# Patient Record
Sex: FEMALE | Race: OTHER | NOT HISPANIC OR LATINO | Employment: FULL TIME | URBAN - METROPOLITAN AREA
[De-identification: names, ages, dates, MRNs, and addresses within clinical notes are randomized per-mention and may not be internally consistent; named-entity substitution may affect disease eponyms.]

---

## 2017-04-10 ENCOUNTER — OFFICE VISIT (OUTPATIENT)
Dept: FAMILY MEDICINE CLINIC | Facility: CLINIC | Age: 32
End: 2017-04-10

## 2017-04-10 VITALS
OXYGEN SATURATION: 98 % | WEIGHT: 159 LBS | SYSTOLIC BLOOD PRESSURE: 118 MMHG | HEART RATE: 75 BPM | DIASTOLIC BLOOD PRESSURE: 82 MMHG | BODY MASS INDEX: 28.17 KG/M2 | TEMPERATURE: 97.7 F | HEIGHT: 63 IN

## 2017-04-10 DIAGNOSIS — Z87.442 HISTORY OF RENAL STONE: ICD-10-CM

## 2017-04-10 DIAGNOSIS — R10.9 FLANK PAIN: Primary | ICD-10-CM

## 2017-04-10 DIAGNOSIS — R31.9 HEMATURIA: ICD-10-CM

## 2017-04-10 LAB
B-HCG UR QL: NEGATIVE
BILIRUB BLD-MCNC: NEGATIVE MG/DL
CLARITY, POC: CLEAR
COLOR UR: ABNORMAL
GLUCOSE UR STRIP-MCNC: NEGATIVE MG/DL
INTERNAL NEGATIVE CONTROL: NEGATIVE
INTERNAL POSITIVE CONTROL: POSITIVE
KETONES UR QL: ABNORMAL
LEUKOCYTE EST, POC: NEGATIVE
Lab: NORMAL
NITRITE UR-MCNC: NEGATIVE MG/ML
PH UR: 5 [PH] (ref 5–8)
PROT UR STRIP-MCNC: ABNORMAL MG/DL
RBC # UR STRIP: ABNORMAL /UL
SP GR UR: 1.01 (ref 1–1.03)
UROBILINOGEN UR QL: NORMAL

## 2017-04-10 PROCEDURE — 99214 OFFICE O/P EST MOD 30 MIN: CPT | Performed by: NURSE PRACTITIONER

## 2017-04-10 PROCEDURE — 81002 URINALYSIS NONAUTO W/O SCOPE: CPT | Performed by: NURSE PRACTITIONER

## 2017-04-10 PROCEDURE — 81025 URINE PREGNANCY TEST: CPT | Performed by: NURSE PRACTITIONER

## 2017-04-10 RX ORDER — PROPRANOLOL HYDROCHLORIDE 10 MG/1
10 TABLET ORAL 3 TIMES DAILY
COMMUNITY
Start: 2017-01-10 | End: 2021-07-12

## 2017-04-10 RX ORDER — HYDROCODONE BITARTRATE AND ACETAMINOPHEN 10; 325 MG/1; MG/1
1 TABLET ORAL EVERY 6 HOURS PRN
Qty: 20 TABLET | Refills: 0 | Status: SHIPPED | OUTPATIENT
Start: 2017-04-10 | End: 2018-04-30

## 2017-04-10 RX ORDER — LEVOTHYROXINE SODIUM 112 UG/1
100 CAPSULE ORAL DAILY
COMMUNITY
Start: 2017-03-27 | End: 2019-11-08

## 2017-04-10 RX ORDER — NITROFURANTOIN 25; 75 MG/1; MG/1
100 CAPSULE ORAL 2 TIMES DAILY
Qty: 14 CAPSULE | Refills: 0 | Status: SHIPPED | OUTPATIENT
Start: 2017-04-10 | End: 2017-04-27

## 2017-04-10 RX ORDER — LIOTHYRONINE SODIUM 5 UG/1
5 TABLET ORAL DAILY
COMMUNITY
Start: 2017-04-01 | End: 2023-03-28

## 2017-04-10 NOTE — PROGRESS NOTES
Subjective   Aster Rosenberg is a 31 y.o. female.     History of Present Illness Patient states that she has had lower left back pain for 3 weeks. Today it has been constant and stabbing. Pt has a remote hx of a kidney stone 15 years ago.  LMP Feb 20, 2017 - Pt is actively trying to conceive. Pt reports hematuria and flank pain. Pain 9/10.   Urinary Tract Infection: Patient complains of burning with urination, dysuria, hematuria, nausea, suprapubic pressure and urgency She has had symptoms for 3 weeks. Patient also complains of back pain. Patient denies fever. Patient does have a history of recurrent UTI.  Patient does not have a history of pyelonephritis.     The following portions of the patient's history were reviewed and updated as appropriate: allergies, current medications, past family history, past medical history, past social history, past surgical history and problem list.    Review of Systems   Constitutional: Negative for chills and fever.   Gastrointestinal: Positive for nausea.   Genitourinary: Positive for frequency.   Musculoskeletal: Positive for back pain.       Objective   Physical Exam   Constitutional: She appears well-developed and well-nourished. No distress.   Cardiovascular: Normal rate and regular rhythm.    Pulmonary/Chest: Effort normal.   Abdominal: Soft. There is no tenderness. There is CVA tenderness. There is no guarding.   Musculoskeletal: She exhibits no edema.   Skin: Skin is warm and dry. No rash noted.   Nursing note and vitals reviewed.      Assessment/Plan   Aster was seen today for back pain.    Diagnoses and all orders for this visit:    Flank pain  -     POCT urinalysis dipstick, manual  -     POCT pregnancy, urine  -     Ambulatory Referral to Urology  -     HYDROcodone-acetaminophen (NORCO)  MG per tablet; Take 1 tablet by mouth Every 6 (Six) Hours As Needed for Moderate Pain (4-6).  -     nitrofurantoin, macrocrystal-monohydrate, (MACROBID) 100 MG capsule; Take 1  capsule by mouth 2 (Two) Times a Day.    Hematuria  -     POCT urinalysis dipstick, manual  -     POCT pregnancy, urine  -     Ambulatory Referral to Urology  -     HYDROcodone-acetaminophen (NORCO)  MG per tablet; Take 1 tablet by mouth Every 6 (Six) Hours As Needed for Moderate Pain (4-6).  -     nitrofurantoin, macrocrystal-monohydrate, (MACROBID) 100 MG capsule; Take 1 capsule by mouth 2 (Two) Times a Day.    History of renal stone  -     Ambulatory Referral to Urology  -     nitrofurantoin, macrocrystal-monohydrate, (MACROBID) 100 MG capsule; Take 1 capsule by mouth 2 (Two) Times a Day.    Sulfa makes face swell.     Pt does not want to go to the ER. I advised to increase fluids, IF PT GETS A FEVER, WORSENING BACK PAIN TO GET TO ER.   HALIMA query complete. Treatment plan to include limited course of prescribed  controlled substance. Risks including addiction, benefits, and alternatives presented to patient.

## 2017-04-10 NOTE — PATIENT INSTRUCTIONS
Kidney Stones  Kidney stones (urolithiasis) are deposits that form inside your kidneys. The intense pain is caused by the stone moving through the urinary tract. When the stone moves, the ureter goes into spasm around the stone. The stone is usually passed in the urine.   CAUSES   · A disorder that makes certain neck glands produce too much parathyroid hormone (primary hyperparathyroidism).  · A buildup of uric acid crystals, similar to gout in your joints.  · Narrowing (stricture) of the ureter.  · A kidney obstruction present at birth (congenital obstruction).  · Previous surgery on the kidney or ureters.  · Numerous kidney infections.  SYMPTOMS   · Feeling sick to your stomach (nauseous).  · Throwing up (vomiting).  · Blood in the urine (hematuria).  · Pain that usually spreads (radiates) to the groin.  · Frequency or urgency of urination.  DIAGNOSIS   · Taking a history and physical exam.  · Blood or urine tests.  · CT scan.  · Occasionally, an examination of the inside of the urinary bladder (cystoscopy) is performed.  TREATMENT   · Observation.  · Increasing your fluid intake.  · Extracorporeal shock wave lithotripsy--This is a noninvasive procedure that uses shock waves to break up kidney stones.  · Surgery may be needed if you have severe pain or persistent obstruction. There are various surgical procedures. Most of the procedures are performed with the use of small instruments. Only small incisions are needed to accommodate these instruments, so recovery time is minimized.  The size, location, and chemical composition are all important variables that will determine the proper choice of action for you. Talk to your health care provider to better understand your situation so that you will minimize the risk of injury to yourself and your kidney.   HOME CARE INSTRUCTIONS   · Drink enough water and fluids to keep your urine clear or pale yellow. This will help you to pass the stone or stone fragments.  · Strain  all urine through the provided strainer. Keep all particulate matter and stones for your health care provider to see. The stone causing the pain may be as small as a grain of salt. It is very important to use the strainer each and every time you pass your urine. The collection of your stone will allow your health care provider to analyze it and verify that a stone has actually passed. The stone analysis will often identify what you can do to reduce the incidence of recurrences.  · Only take over-the-counter or prescription medicines for pain, discomfort, or fever as directed by your health care provider.  · Keep all follow-up visits as told by your health care provider. This is important.  · Get follow-up X-rays if required. The absence of pain does not always mean that the stone has passed. It may have only stopped moving. If the urine remains completely obstructed, it can cause loss of kidney function or even complete destruction of the kidney. It is your responsibility to make sure X-rays and follow-ups are completed. Ultrasounds of the kidney can show blockages and the status of the kidney. Ultrasounds are not associated with any radiation and can be performed easily in a matter of minutes.  · Make changes to your daily diet as told by your health care provider. You may be told to:  ¨ Limit the amount of salt that you eat.  ¨ Eat 5 or more servings of fruits and vegetables each day.  ¨ Limit the amount of meat, poultry, fish, and eggs that you eat.  · Collect a 24-hour urine sample as told by your health care provider. You may need to collect another urine sample every 6-12 months.  SEEK MEDICAL CARE IF:  · You experience pain that is progressive and unresponsive to any pain medicine you have been prescribed.  SEEK IMMEDIATE MEDICAL CARE IF:   · Pain cannot be controlled with the prescribed medicine.  · You have a fever or shaking chills.  · The severity or intensity of pain increases over 18 hours and is not  relieved by pain medicine.  · You develop a new onset of abdominal pain.  · You feel faint or pass out.  · You are unable to urinate.     This information is not intended to replace advice given to you by your health care provider. Make sure you discuss any questions you have with your health care provider.     Document Released: 12/18/2006 Document Revised: 09/07/2016 Document Reviewed: 05/21/2014  [employer] Interactive Patient Education ©2016 [employer]

## 2017-04-27 RX ORDER — CEPHALEXIN 500 MG/1
500 CAPSULE ORAL 3 TIMES DAILY
COMMUNITY
End: 2018-04-30

## 2017-04-28 ENCOUNTER — ANESTHESIA EVENT (OUTPATIENT)
Dept: PERIOP | Facility: HOSPITAL | Age: 32
End: 2017-04-28

## 2017-04-28 ENCOUNTER — ANESTHESIA (OUTPATIENT)
Dept: PERIOP | Facility: HOSPITAL | Age: 32
End: 2017-04-28

## 2017-04-28 ENCOUNTER — HOSPITAL ENCOUNTER (OUTPATIENT)
Facility: HOSPITAL | Age: 32
Setting detail: HOSPITAL OUTPATIENT SURGERY
Discharge: HOME | End: 2017-04-28
Attending: UROLOGY | Admitting: UROLOGY

## 2017-04-28 ENCOUNTER — APPOINTMENT (OUTPATIENT)
Dept: GENERAL RADIOLOGY | Facility: HOSPITAL | Age: 32
End: 2017-04-28
Attending: UROLOGY

## 2017-04-28 VITALS
HEIGHT: 63 IN | SYSTOLIC BLOOD PRESSURE: 102 MMHG | RESPIRATION RATE: 16 BRPM | BODY MASS INDEX: 27.46 KG/M2 | TEMPERATURE: 97.9 F | HEART RATE: 68 BPM | OXYGEN SATURATION: 99 % | DIASTOLIC BLOOD PRESSURE: 72 MMHG | WEIGHT: 155 LBS

## 2017-04-28 DIAGNOSIS — N20.1 URETERAL CALCULUS, RIGHT: Primary | ICD-10-CM

## 2017-04-28 LAB
B-HCG UR QL: NEGATIVE
INTERNAL NEGATIVE CONTROL: NEGATIVE
INTERNAL POSITIVE CONTROL: POSITIVE
Lab: NORMAL

## 2017-04-28 PROCEDURE — 74000 HC ABDOMEN KUB: CPT

## 2017-04-28 PROCEDURE — 25010000003 CEFAZOLIN PER 500 MG: Performed by: UROLOGY

## 2017-04-28 PROCEDURE — 25010000002 DEXAMETHASONE PER 1 MG: Performed by: NURSE ANESTHETIST, CERTIFIED REGISTERED

## 2017-04-28 PROCEDURE — 25010000002 PROPOFOL 10 MG/ML EMULSION: Performed by: NURSE ANESTHETIST, CERTIFIED REGISTERED

## 2017-04-28 PROCEDURE — 25010000002 MIDAZOLAM PER 1 MG: Performed by: ANESTHESIOLOGY

## 2017-04-28 PROCEDURE — 25010000002 FENTANYL CITRATE (PF) 100 MCG/2ML SOLUTION: Performed by: ANESTHESIOLOGY

## 2017-04-28 PROCEDURE — 25010000002 ONDANSETRON PER 1 MG: Performed by: NURSE ANESTHETIST, CERTIFIED REGISTERED

## 2017-04-28 RX ORDER — HYDRALAZINE HYDROCHLORIDE 20 MG/ML
5 INJECTION INTRAMUSCULAR; INTRAVENOUS
Status: DISCONTINUED | OUTPATIENT
Start: 2017-04-28 | End: 2017-04-28 | Stop reason: HOSPADM

## 2017-04-28 RX ORDER — ACETAMINOPHEN 325 MG/1
650 TABLET ORAL ONCE AS NEEDED
Status: DISCONTINUED | OUTPATIENT
Start: 2017-04-28 | End: 2017-04-28 | Stop reason: HOSPADM

## 2017-04-28 RX ORDER — NALOXONE HCL 0.4 MG/ML
0.4 VIAL (ML) INJECTION AS NEEDED
Status: DISCONTINUED | OUTPATIENT
Start: 2017-04-28 | End: 2017-04-28 | Stop reason: HOSPADM

## 2017-04-28 RX ORDER — HYDROMORPHONE HYDROCHLORIDE 1 MG/ML
0.25 INJECTION, SOLUTION INTRAMUSCULAR; INTRAVENOUS; SUBCUTANEOUS
Status: DISCONTINUED | OUTPATIENT
Start: 2017-04-28 | End: 2017-04-28 | Stop reason: HOSPADM

## 2017-04-28 RX ORDER — NALBUPHINE HCL 10 MG/ML
2 AMPUL (ML) INJECTION EVERY 4 HOURS PRN
Status: DISCONTINUED | OUTPATIENT
Start: 2017-04-28 | End: 2017-04-28 | Stop reason: HOSPADM

## 2017-04-28 RX ORDER — PROMETHAZINE HYDROCHLORIDE 25 MG/1
25 TABLET ORAL ONCE AS NEEDED
Status: DISCONTINUED | OUTPATIENT
Start: 2017-04-28 | End: 2017-04-28 | Stop reason: HOSPADM

## 2017-04-28 RX ORDER — PROMETHAZINE HYDROCHLORIDE 25 MG/ML
6.25 INJECTION, SOLUTION INTRAMUSCULAR; INTRAVENOUS ONCE AS NEEDED
Status: DISCONTINUED | OUTPATIENT
Start: 2017-04-28 | End: 2017-04-28 | Stop reason: HOSPADM

## 2017-04-28 RX ORDER — MIDAZOLAM HYDROCHLORIDE 1 MG/ML
1 INJECTION INTRAMUSCULAR; INTRAVENOUS
Status: DISCONTINUED | OUTPATIENT
Start: 2017-04-28 | End: 2017-04-28 | Stop reason: HOSPADM

## 2017-04-28 RX ORDER — SCOLOPAMINE TRANSDERMAL SYSTEM 1 MG/1
1 PATCH, EXTENDED RELEASE TRANSDERMAL ONCE
Status: DISCONTINUED | OUTPATIENT
Start: 2017-04-28 | End: 2017-04-28 | Stop reason: HOSPADM

## 2017-04-28 RX ORDER — CIPROFLOXACIN 500 MG/1
500 TABLET, FILM COATED ORAL 2 TIMES DAILY
Qty: 10 TABLET | Refills: 0 | Status: SHIPPED | OUTPATIENT
Start: 2017-04-28 | End: 2017-05-03

## 2017-04-28 RX ORDER — ACETAMINOPHEN 650 MG/1
650 SUPPOSITORY RECTAL ONCE AS NEEDED
Status: DISCONTINUED | OUTPATIENT
Start: 2017-04-28 | End: 2017-04-28 | Stop reason: HOSPADM

## 2017-04-28 RX ORDER — LIDOCAINE HYDROCHLORIDE 20 MG/ML
INJECTION, SOLUTION INFILTRATION; PERINEURAL AS NEEDED
Status: DISCONTINUED | OUTPATIENT
Start: 2017-04-28 | End: 2017-04-28 | Stop reason: SURG

## 2017-04-28 RX ORDER — PROPOFOL 10 MG/ML
VIAL (ML) INTRAVENOUS AS NEEDED
Status: DISCONTINUED | OUTPATIENT
Start: 2017-04-28 | End: 2017-04-28 | Stop reason: SURG

## 2017-04-28 RX ORDER — SODIUM CHLORIDE, SODIUM LACTATE, POTASSIUM CHLORIDE, CALCIUM CHLORIDE 600; 310; 30; 20 MG/100ML; MG/100ML; MG/100ML; MG/100ML
9 INJECTION, SOLUTION INTRAVENOUS CONTINUOUS
Status: DISCONTINUED | OUTPATIENT
Start: 2017-04-28 | End: 2017-04-28 | Stop reason: HOSPADM

## 2017-04-28 RX ORDER — OXYCODONE HYDROCHLORIDE AND ACETAMINOPHEN 5; 325 MG/1; MG/1
1 TABLET ORAL ONCE AS NEEDED
Status: DISCONTINUED | OUTPATIENT
Start: 2017-04-28 | End: 2017-04-28 | Stop reason: HOSPADM

## 2017-04-28 RX ORDER — NALBUPHINE HCL 10 MG/ML
10 AMPUL (ML) INJECTION EVERY 4 HOURS PRN
Status: DISCONTINUED | OUTPATIENT
Start: 2017-04-28 | End: 2017-04-28 | Stop reason: HOSPADM

## 2017-04-28 RX ORDER — FENTANYL CITRATE 50 UG/ML
50 INJECTION, SOLUTION INTRAMUSCULAR; INTRAVENOUS
Status: DISCONTINUED | OUTPATIENT
Start: 2017-04-28 | End: 2017-04-28 | Stop reason: HOSPADM

## 2017-04-28 RX ORDER — DEXAMETHASONE SODIUM PHOSPHATE 10 MG/ML
INJECTION INTRAMUSCULAR; INTRAVENOUS AS NEEDED
Status: DISCONTINUED | OUTPATIENT
Start: 2017-04-28 | End: 2017-04-28 | Stop reason: SURG

## 2017-04-28 RX ORDER — LIDOCAINE HYDROCHLORIDE 10 MG/ML
5 INJECTION, SOLUTION EPIDURAL; INFILTRATION; INTRACAUDAL; PERINEURAL ONCE
Status: DISCONTINUED | OUTPATIENT
Start: 2017-04-28 | End: 2017-04-28 | Stop reason: HOSPADM

## 2017-04-28 RX ORDER — PROMETHAZINE HYDROCHLORIDE 25 MG/1
25 SUPPOSITORY RECTAL ONCE AS NEEDED
Status: DISCONTINUED | OUTPATIENT
Start: 2017-04-28 | End: 2017-04-28 | Stop reason: HOSPADM

## 2017-04-28 RX ORDER — ONDANSETRON 2 MG/ML
INJECTION INTRAMUSCULAR; INTRAVENOUS AS NEEDED
Status: DISCONTINUED | OUTPATIENT
Start: 2017-04-28 | End: 2017-04-28 | Stop reason: SURG

## 2017-04-28 RX ORDER — DIPHENHYDRAMINE HYDROCHLORIDE 50 MG/ML
12.5 INJECTION INTRAMUSCULAR; INTRAVENOUS
Status: DISCONTINUED | OUTPATIENT
Start: 2017-04-28 | End: 2017-04-28 | Stop reason: HOSPADM

## 2017-04-28 RX ORDER — MIDAZOLAM HYDROCHLORIDE 1 MG/ML
2 INJECTION INTRAMUSCULAR; INTRAVENOUS
Status: DISCONTINUED | OUTPATIENT
Start: 2017-04-28 | End: 2017-04-28 | Stop reason: HOSPADM

## 2017-04-28 RX ORDER — SODIUM CHLORIDE 0.9 % (FLUSH) 0.9 %
1-10 SYRINGE (ML) INJECTION AS NEEDED
Status: DISCONTINUED | OUTPATIENT
Start: 2017-04-28 | End: 2017-04-28 | Stop reason: HOSPADM

## 2017-04-28 RX ORDER — HYDROCODONE BITARTRATE AND ACETAMINOPHEN 7.5; 325 MG/1; MG/1
1-2 TABLET ORAL EVERY 4 HOURS PRN
Qty: 20 TABLET | Refills: 0 | Status: SHIPPED | OUTPATIENT
Start: 2017-04-28 | End: 2018-04-30

## 2017-04-28 RX ORDER — FAMOTIDINE 10 MG/ML
20 INJECTION, SOLUTION INTRAVENOUS ONCE
Status: COMPLETED | OUTPATIENT
Start: 2017-04-28 | End: 2017-04-28

## 2017-04-28 RX ORDER — ACETAMINOPHEN 325 MG/1
650 TABLET ORAL ONCE
Status: COMPLETED | OUTPATIENT
Start: 2017-04-28 | End: 2017-04-28

## 2017-04-28 RX ADMIN — ACETAMINOPHEN 650 MG: 325 TABLET ORAL at 08:03

## 2017-04-28 RX ADMIN — LIDOCAINE HYDROCHLORIDE 60 MG: 20 INJECTION, SOLUTION INFILTRATION; PERINEURAL at 08:36

## 2017-04-28 RX ADMIN — ONDANSETRON 4 MG: 2 INJECTION INTRAMUSCULAR; INTRAVENOUS at 08:42

## 2017-04-28 RX ADMIN — SODIUM CHLORIDE, POTASSIUM CHLORIDE, SODIUM LACTATE AND CALCIUM CHLORIDE 9 ML/HR: 600; 310; 30; 20 INJECTION, SOLUTION INTRAVENOUS at 10:30

## 2017-04-28 RX ADMIN — SODIUM CHLORIDE, POTASSIUM CHLORIDE, SODIUM LACTATE AND CALCIUM CHLORIDE: 600; 310; 30; 20 INJECTION, SOLUTION INTRAVENOUS at 08:27

## 2017-04-28 RX ADMIN — PROPOFOL 200 MG: 10 INJECTION, EMULSION INTRAVENOUS at 08:36

## 2017-04-28 RX ADMIN — CEFAZOLIN SODIUM 1 G: 1 INJECTION, SOLUTION INTRAVENOUS at 08:35

## 2017-04-28 RX ADMIN — MIDAZOLAM 2 MG: 1 INJECTION INTRAMUSCULAR; INTRAVENOUS at 08:04

## 2017-04-28 RX ADMIN — SCOPALAMINE 1 PATCH: 1 PATCH, EXTENDED RELEASE TRANSDERMAL at 08:04

## 2017-04-28 RX ADMIN — OXYCODONE HYDROCHLORIDE AND ACETAMINOPHEN 1 TABLET: 5; 325 TABLET ORAL at 11:13

## 2017-04-28 RX ADMIN — SODIUM CHLORIDE, POTASSIUM CHLORIDE, SODIUM LACTATE AND CALCIUM CHLORIDE 9 ML/HR: 600; 310; 30; 20 INJECTION, SOLUTION INTRAVENOUS at 08:03

## 2017-04-28 RX ADMIN — DEXAMETHASONE SODIUM PHOSPHATE 4 MG: 10 INJECTION INTRAMUSCULAR; INTRAVENOUS at 08:42

## 2017-04-28 RX ADMIN — FAMOTIDINE 20 MG: 10 INJECTION, SOLUTION INTRAVENOUS at 08:04

## 2017-04-28 RX ADMIN — FENTANYL CITRATE 50 MCG: 50 INJECTION INTRAMUSCULAR; INTRAVENOUS at 10:27

## 2017-04-28 NOTE — ANESTHESIA PROCEDURE NOTES
Airway  Date/Time: 4/28/2017 8:39 AM  Airway not difficult    General Information and Staff    Patient location during procedure: OR  Anesthesiologist: RENDER, KIRILL RAY  CRNA: SILVIA FAJARDO    Indications and Patient Condition  Indications for airway management: airway protection    Preoxygenated: yes  MILS not maintained throughout  Mask difficulty assessment: 1 - vent by mask    Final Airway Details  Final airway type: supraglottic airway      Successful airway: classic  Size 4    Number of attempts at approach: 1    Additional Comments  Preoxygenation FEO2 >85, SIVI, LMA placed with ease, teeth/lips as preop. Secured and placement confirmed.

## 2017-04-28 NOTE — ANESTHESIA POSTPROCEDURE EVALUATION
Patient: Aster Rosenberg    Procedure Summary     Date Anesthesia Start Anesthesia Stop Room / Location    04/28/17 0833 0917  [facility]  [facility]       Procedure Diagnosis Surgeon Provider    LT EXTRACORPOREAL SHOCKWAVE LITHOTRIPSY (Left ) No diagnosis on file. MD Nikita Call MD          Anesthesia Type: general  Last vitals  BP      Temp      Pulse     Resp      SpO2        Post Anesthesia Care and Evaluation    Patient location during evaluation: bedside  Patient participation: complete - patient participated  Level of consciousness: awake and alert  Pain management: adequate  Airway patency: patent  Anesthetic complications: No anesthetic complications    Cardiovascular status: acceptable  Respiratory status: acceptable  Hydration status: acceptable

## 2017-04-28 NOTE — ANESTHESIA PREPROCEDURE EVALUATION
Anesthesia Evaluation     history of anesthetic complications: PONV     Airway   Mallampati: II  TM distance: >3 FB  Neck ROM: full  no difficulty expected  Dental - normal exam     Pulmonary - negative pulmonary ROS and normal exam   Cardiovascular   Exercise tolerance: good (4-7 METS)    Rhythm: regular    (-) murmur      Neuro/Psych- negative ROS  GI/Hepatic/Renal/Endo    (+)  chronic renal disease, hypothyroidism,     Musculoskeletal (-) negative ROS    Abdominal    Substance History      OB/GYN          Other                                    Anesthesia Plan    ASA 2     general   (  D/W R&B of GA including but not limited to: heart, lung, liver, kidney, neurologic problems, positioning injuries, dental damage, corneal abrasion and TMJ.  .)  intravenous induction   Anesthetic plan and risks discussed with patient.

## 2017-05-11 ENCOUNTER — TRANSCRIBE ORDERS (OUTPATIENT)
Dept: ADMINISTRATIVE | Facility: HOSPITAL | Age: 32
End: 2017-05-11

## 2017-05-11 DIAGNOSIS — N20.0 KIDNEY STONE: Primary | ICD-10-CM

## 2017-07-13 ENCOUNTER — TRANSCRIBE ORDERS (OUTPATIENT)
Dept: ADMINISTRATIVE | Facility: HOSPITAL | Age: 32
End: 2017-07-13

## 2017-07-13 ENCOUNTER — HOSPITAL ENCOUNTER (OUTPATIENT)
Dept: GENERAL RADIOLOGY | Facility: HOSPITAL | Age: 32
Discharge: HOME | End: 2017-07-13
Attending: UROLOGY | Admitting: UROLOGY

## 2017-07-13 DIAGNOSIS — N20.0 CALCULUS, RENAL: ICD-10-CM

## 2017-07-13 DIAGNOSIS — N20.0 CALCULUS, RENAL: Primary | ICD-10-CM

## 2017-07-13 PROCEDURE — 74000 HC ABDOMEN KUB: CPT

## 2018-01-26 ENCOUNTER — TRANSCRIBE ORDERS (OUTPATIENT)
Dept: ADMINISTRATIVE | Facility: HOSPITAL | Age: 33
End: 2018-01-26

## 2018-01-26 DIAGNOSIS — Z31.69 PRE-CONCEPTION COUNSELING: Primary | ICD-10-CM

## 2018-02-12 ENCOUNTER — HOSPITAL ENCOUNTER (OUTPATIENT)
Dept: ULTRASOUND IMAGING | Facility: HOSPITAL | Age: 33
Discharge: HOME | End: 2018-02-12
Attending: OBSTETRICS & GYNECOLOGY

## 2018-02-12 NOTE — CONSULTS
"Ms. Rosenberg is referred by Dr. Molina for MFM consultation on indication of preconception counseling.  She was unaccompanied during today's consultation.    32  not presently pregnant.    Ms. 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  She has an appointment in two weeks with a Rheumatologist in [place] (who her mother sees).   Pt reports a history of fibromyalgias.  She reports knee and muscle stiffness.  She reports that yesterday her knees and leg muscles were very stiff.   AUDREY + 1:160    PMH    Ob  G1 10/24/17 SAB D&C at 9 weeks FHT was observed.    Gyn:  Irregular periods:  Clomid was required for previous conception.  States she received a Clomid prescription from Dr. Adams's nurse practitioner, but states she received no other supervision regarding conception.       Past Medical History:   Diagnosis Date   • Heart abnormality     \"tendon floating in heart\"  takes Inderal   • History of kidney stones    • History of migraine    • History of MRSA infection     x3, last infection 3-, low buttock at panty leg line, treated by PCP   • Kidney stone     Left   • Lupus     cutaneous, not treated   • PONV (postoperative nausea and vomiting)    • Thyroid disease     hypothyroid   Dr. Moran endocrine    Allergies   Allergen Reactions   • Iodinated Diagnostic Agents Hives   • Sulfa Antibiotics      Lips breakout, swelling   • Tape      Skin irritation for periods of time       Past Surgical History:   Procedure Laterality Date   • CYSTOSCOPY W/ URETERAL STENT PLACEMENT     • D&C WITH SUCTION  10/24/2017    missed Ab   • EXTRACORPOREAL SHOCK WAVE LITHOTRIPSY (ESWL) Left 2017    Procedure: LT EXTRACORPOREAL SHOCKWAVE LITHOTRIPSY;  Surgeon: Adryan Limon MD;  [place] [facility] [place] [facility] [place]:    Reports she gets a kidney stone approximately every 5 years.      Current Outpatient Prescriptions:   •  cephalexin (KEFLEX) 500 MG capsule, " "Take 500 mg by mouth 3 (Three) Times a Day., Disp: , Rfl:   •  HYDROcodone-acetaminophen (NORCO)  MG per tablet, Take 1 tablet by mouth Every 6 (Six) Hours As Needed for Moderate Pain (4-6)., Disp: 20 tablet, Rfl: 0  •  HYDROcodone-acetaminophen (NORCO) 7.5-325 MG per tablet, Take 1-2 tablets by mouth Every 4 (Four) Hours As Needed for Moderate Pain (4-6) (Pain) for up to 20 doses., Disp: 20 tablet, Rfl: 0  •  liothyronine (CYTOMEL) 5 MCG tablet, Take 5 mcg by mouth Daily., Disp: , Rfl:   •  propranolol (INDERAL) 10 MG tablet, Take 10 mg by mouth 3 (Three) Times a Day., Disp: , Rfl:   •  TIROSINT 112 MCG capsule, Take 112 mcg by mouth Daily., Disp: , Rfl:   Last hydrocodone after SAB in Oct 2017    Family history is negative for mental retardation, trisomy 21, congenital heart disease, spina bifida, cystic fibrosis, Shinto ancestry.    Social history  No tobacco, alcohol, street drug use  Employment: . 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      Constitutional  BMI 26 63\" 147#    Labs  Prenatal labs are not available in EPIC for review.      Vaccines:  Flu 9/28/17  TDaP > 5 yr ago       Impression/discussion with pt:  1) S/p spontaneous miscarriage (SAB) 10/2018.  Etiology not evaluted    2) It is OK to attempt pregnancy now, there is no advantage to waiting.  Literature suggests no advantage from conception after 3 months post miscarriage as compared to conception before 3 months after miscarriage.     3) Incidence of miscarriage in healthy women is approximately 15%, ie 3 of every 20 pregnancies.      4) The most frequent cause of miscarriage is genetic: 50-70% of first trimester pregnancy losses are genetic etiology.     5) Other less frequent etiologies of pregnancy loss include antiphospholipid syndrome, which is a clinical diagnosis (ie based on clinical symptoms, eg clots, repeteitive pregnancy loss) and supported by lab tests.  Approximately 2-5% of the " "population will have abnormal antiphospholipid test results in the absence of actual disease.       6) Anovulation per hx irregular cycles, previous clomid use.      Recommendations:    1.  Folic acid: 4 mg/day x 3 months prior to conception and during 1st 2 months of pregnancy.  Folic acid 1mg tablets are available over the counter.  Daily folic acid prophylaxis (400mcg/day) is recommended for all women of reproductive age.  Folic acid 400mcg daily x 3 months prior to conception; [facility] information sheet provided. Pt is familiar with this recommendation.      2.  Complete Prenatal labs (eg rubella, HIV, hep C) at Dr. Molian's office.     3.  Consider Expanded carrier screening (eg Counsyl) versus cystic fibrosis carrier screening, SMA screening was reviewed.      4. TDaP vaccine    5.  Recommend Parovirus, CMV IgG serology based on occupational exposure as , to be performed at Dr. Molina's office.    6.  Suggest: Reproductive endocrinology consult, eg Osiris Merlos MD or Dalia Patel MD particularly RE hx anovulation, hx clomid required for conception.      7.  Suggest F/U appointment in 2-4 weeks with Dr Molina to review or obtain prenatal labs, discuss further care, including TDaP vaccine, Parvovirus & CMV serology, and expanded carrier testing versus cystic fibrosis and SMA carrier screening.     8. Suggest urine protein/creatinine ratio or 24 hour urine and microscopic urine for casts.    Above reviewed by phone with Dr. Molina    Printed information provided:    [facility]: Folic Acid prophylaxis    Counsyl Foresight screen: web page  [employer] Pamphlets  [employer]:  Cystic fibrosis:  Carrier Testing for Common Genetic Diseases: SMA, Fragile X    CDC: \"Talking with pregnant patients about CMV\"    For billing purposes, duration of face to face consultation was approximately 30 minutes of which > 50% was devoted to patient counseling and coordination of " care.

## 2018-04-04 ENCOUNTER — OFFICE VISIT CONVERTED (OUTPATIENT)
Dept: CARDIOLOGY | Facility: CLINIC | Age: 33
End: 2018-04-04
Attending: INTERNAL MEDICINE

## 2018-04-30 ENCOUNTER — OFFICE VISIT (OUTPATIENT)
Dept: FAMILY MEDICINE CLINIC | Facility: CLINIC | Age: 33
End: 2018-04-30

## 2018-04-30 VITALS
BODY MASS INDEX: 26.4 KG/M2 | OXYGEN SATURATION: 98 % | RESPIRATION RATE: 16 BRPM | HEART RATE: 69 BPM | TEMPERATURE: 98.5 F | DIASTOLIC BLOOD PRESSURE: 76 MMHG | WEIGHT: 149 LBS | HEIGHT: 63 IN | SYSTOLIC BLOOD PRESSURE: 108 MMHG

## 2018-04-30 DIAGNOSIS — J35.8 CRYPTIC TONSIL: Primary | ICD-10-CM
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
## 2018-04-30 PROBLEM — M35.00 SJOGREN'S SYNDROME (HCC): Status: ACTIVE | Noted: 2017-09-13

## 2018-04-30 PROBLEM — M32.8 OTHER FORMS OF SYSTEMIC LUPUS ERYTHEMATOSUS (HCC): Status: ACTIVE | Noted: 2018-02-26

## 2018-04-30 PROCEDURE — 90471 IMMUNIZATION ADMIN: CPT | Performed by: FAMILY MEDICINE

## 2018-04-30 PROCEDURE — 90632 HEPA VACCINE ADULT IM: CPT | Performed by: FAMILY MEDICINE

## 2018-04-30 PROCEDURE — 90472 IMMUNIZATION ADMIN EACH ADD: CPT | Performed by: FAMILY MEDICINE

## 2018-04-30 PROCEDURE — 90732 PPSV23 VACC 2 YRS+ SUBQ/IM: CPT | Performed by: FAMILY MEDICINE

## 2018-04-30 PROCEDURE — 99212 OFFICE O/P EST SF 10 MIN: CPT | Performed by: FAMILY MEDICINE

## 2018-04-30 RX ORDER — HYDROXYCHLOROQUINE SULFATE 200 MG/1
200 TABLET, FILM COATED ORAL 2 TIMES DAILY
COMMUNITY
End: 2023-03-28

## 2018-04-30 NOTE — PROGRESS NOTES
Problem List Items Addressed This Visit     None      Visit Diagnoses     Cryptic tonsil    -  Primary    Cat bite, initial encounter          Patient would like to be covered for pneumonia and given her SLE that is a reasonable request.        Return if symptoms worsen or fail to improve.  Cat bite looks great and she is UTD on her tdap  Aster Rosenberg is a 32 y.o. female being seen in our office today for Sore Throat (white stuff in throat) and Headache                 She  reports that she has never smoked. She has never used smokeless tobacco. She reports that she drinks alcohol. She reports that she does not use drugs.             HPI Started two weeks ago. Began with some white patches and a sore throat. No fever. She has checked. Not much cough. Sl upper resp symptoms but had pneumonia early this month. She is much better from that. On Plaquenil for her SLE.              The following portions of the patient's history were reviewed and updated as appropriate:PMHroutine: Social history , Allergies, Current Medications, Active Problem List and Health Maintenance            Review of Systems   Constitutional: Positive for fatigue. Negative for activity change, appetite change, chills, fever and unexpected weight change.   HENT: Positive for postnasal drip and sore throat. Negative for congestion, ear pain, hearing loss, nosebleeds and rhinorrhea.    Eyes: Negative for pain, redness and visual disturbance.   Respiratory: Negative for cough, shortness of breath and wheezing.    Cardiovascular: Negative for chest pain, palpitations and leg swelling.   Gastrointestinal: Negative for abdominal pain, blood in stool, constipation, diarrhea, nausea and vomiting.   Endocrine: Negative for cold intolerance and heat intolerance.   Genitourinary: Negative for difficulty urinating, dysuria, frequency, hematuria, pelvic pain, urgency and vaginal discharge.   Musculoskeletal: Negative for arthralgias, back pain and joint  swelling.   Skin: Positive for wound (bit by a cat yesterday. ). Negative for rash.   Neurological: Negative for dizziness, weakness, numbness and headaches.   Hematological: Does not bruise/bleed easily.   Psychiatric/Behavioral: Negative for dysphoric mood, sleep disturbance and suicidal ideas. The patient is not nervous/anxious.                  BP Readings from Last 1 Encounters:   04/30/18 108/76     Wt Readings from Last 3 Encounters:   04/30/18 67.6 kg (149 lb)   04/27/17 70.3 kg (155 lb)   04/10/17 72.1 kg (159 lb)   Body mass index is 26.39 kg/m².                 Physical Exam   Constitutional: She appears well-developed and well-nourished. No distress.   HENT:   Right Ear: External ear normal.   Left Ear: External ear normal.   Mouth/Throat: Oropharynx is clear and moist. No oropharyngeal exudate (but patient has cryptic tonsils).   Abdominal: Soft. Bowel sounds are normal. She exhibits no distension and no mass. There is no tenderness. There is no rebound and no guarding.   Skin:   Left hand with well healing linear surface wound on posterior and anterior hand   Psychiatric: She has a normal mood and affect. Her behavior is normal. Judgment and thought content normal.   Vitals reviewed.

## 2019-02-20 ENCOUNTER — OFFICE VISIT CONVERTED (OUTPATIENT)
Dept: CARDIOLOGY | Facility: CLINIC | Age: 34
End: 2019-02-20
Attending: INTERNAL MEDICINE

## 2019-02-20 ENCOUNTER — CONVERSION ENCOUNTER (OUTPATIENT)
Dept: OTHER | Facility: HOSPITAL | Age: 34
End: 2019-02-20

## 2019-04-11 ENCOUNTER — TRANSCRIBE ORDERS (OUTPATIENT)
Dept: ADMINISTRATIVE | Facility: HOSPITAL | Age: 34
End: 2019-04-11

## 2019-04-11 ENCOUNTER — HOSPITAL ENCOUNTER (OUTPATIENT)
Dept: GENERAL RADIOLOGY | Facility: HOSPITAL | Age: 34
Discharge: HOME | End: 2019-04-11
Admitting: UROLOGY

## 2019-04-11 DIAGNOSIS — N20.0 CALCULUS, RENAL: ICD-10-CM

## 2019-04-11 DIAGNOSIS — N20.0 CALCULUS, RENAL: Primary | ICD-10-CM

## 2019-04-11 PROCEDURE — 74018 RADEX ABDOMEN 1 VIEW: CPT

## 2019-11-08 ENCOUNTER — OFFICE VISIT (OUTPATIENT)
Dept: FAMILY MEDICINE CLINIC | Facility: CLINIC | Age: 34
End: 2019-11-08

## 2019-11-08 VITALS
HEIGHT: 64 IN | SYSTOLIC BLOOD PRESSURE: 120 MMHG | WEIGHT: 163.6 LBS | OXYGEN SATURATION: 98 % | DIASTOLIC BLOOD PRESSURE: 72 MMHG | BODY MASS INDEX: 27.93 KG/M2 | RESPIRATION RATE: 14 BRPM | HEART RATE: 78 BPM

## 2019-11-08 DIAGNOSIS — Z00.00 HEALTHCARE MAINTENANCE: Primary | ICD-10-CM

## 2019-11-08 PROCEDURE — 99395 PREV VISIT EST AGE 18-39: CPT | Performed by: FAMILY MEDICINE

## 2019-11-08 RX ORDER — LEVOTHYROXINE SODIUM 125 UG/1
125 TABLET ORAL DAILY
COMMUNITY
Start: 2019-10-26 | End: 2022-05-04

## 2019-11-08 NOTE — PROGRESS NOTES
Patient Instructions   ASSESSMENT AND PLAN     Problem List Items Addressed This Visit     None      Visit Diagnoses     Healthcare maintenance    -  Primary    Relevant Orders    Lipid Panel With LDL / HDL Ratio               Return in about 1 year (around 11/8/2020).  Patient was given instructions and counseling regarding her condition or for health maintenance advice. Please see specific information pulled into the AVS by me.          SUBJECTIVE  Aster Rosenberg is a 34 y.o. female being seen in our office today for Annual Exam               Social History  She  reports that she has never smoked. She has never used smokeless tobacco. She reports that she drinks alcohol. She reports that she does not use drugs.    History of the Present Illness   HPI  Annual Exam-Premenopausal:    Aster Rosenberg 34 y.o.female presents for annual exam.    Health Habits:  Dental Exam. up to date  Exercise: 0 times/week.  Current exercise activities include: none  She is eating a healthy diet.   The patient does wear seatbelts.  She is wearing sunscreen.  Last pap  approximate date Jan 2019 and was normal   .  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  She really is without complaints today other than not enough sleep and increasing joint pain related to her lupus.  She has returned to work full-time.  Significant Past History  The following portions of the patient's history were reviewed and updated as appropriate:PMHroutine: Social history , Past Medical History, Surgical history , Allergies, Current Medications, Active Problem List, Family History and Health Maintenance    Review of Systems   Constitutional: Positive for fatigue. Negative for activity change, appetite change, chills, fever and unexpected weight change.   HENT: Negative for congestion, ear pain, hearing loss, nosebleeds, rhinorrhea and sore throat.     Eyes: Negative for pain, redness and visual disturbance.   Respiratory: Negative for cough, shortness of breath and wheezing.    Cardiovascular: Negative for chest pain, palpitations and leg swelling.   Gastrointestinal: Negative for abdominal pain, blood in stool, constipation, diarrhea, nausea and vomiting.   Endocrine: Negative for cold intolerance and heat intolerance.   Genitourinary: Negative for difficulty urinating, dysuria, frequency, hematuria, pelvic pain, urgency and vaginal discharge.   Musculoskeletal: Positive for arthralgias, joint swelling and myalgias. Negative for back pain.   Skin: Negative for rash and wound.   Allergic/Immunologic: Positive for environmental allergies.   Neurological: Negative for dizziness, weakness, numbness and headaches.   Hematological: Does not bruise/bleed easily.   Psychiatric/Behavioral: Negative for dysphoric mood, sleep disturbance and suicidal ideas. The patient is not nervous/anxious.    I have reviewed the ROS as documented by the MA. Alicia Villafana MD      OBJECTIVE   Vital Signs          BP Readings from Last 1 Encounters:   11/08/19 120/72     Wt Readings from Last 3 Encounters:   11/08/19 74.2 kg (163 lb 9.6 oz)   04/30/18 67.6 kg (149 lb)   04/27/17 70.3 kg (155 lb)   Body mass index is 28.08 kg/m².     Physical Exam   Constitutional: She is oriented to person, place, and time. She appears well-developed and well-nourished. No distress.   HENT:   Head: Normocephalic and atraumatic.   Right Ear: Tympanic membrane, external ear and ear canal normal.   Left Ear: Tympanic membrane, external ear and ear canal normal.   Mouth/Throat: Oropharynx is clear and moist.   Eyes: Conjunctivae are normal.   Neck: Normal range of motion. Neck supple. No tracheal deviation present. No thyromegaly present.   Cardiovascular: Normal rate, regular rhythm, normal heart sounds and intact distal pulses.   Pulmonary/Chest: Effort normal and breath sounds normal. No respiratory  distress. She has no wheezes. She has no rales.   Abdominal: Soft. Bowel sounds are normal. She exhibits no distension. There is no hepatosplenomegaly. There is no tenderness.   Musculoskeletal: She exhibits no edema or deformity.   Lymphadenopathy:     She has no cervical adenopathy.   Neurological: She is alert and oriented to person, place, and time.   Skin: Skin is warm and dry.   Psychiatric: She has a normal mood and affect. Her behavior is normal. Judgment and thought content normal.   Vitals reviewed.    Data Reviewed

## 2019-11-09 LAB
CHOLEST SERPL-MCNC: 173 MG/DL (ref 100–199)
HDLC SERPL-MCNC: 68 MG/DL
LDLC SERPL CALC-MCNC: 83 MG/DL (ref 0–99)
LDLC/HDLC SERPL: 1.2 RATIO (ref 0–3.2)
TRIGL SERPL-MCNC: 110 MG/DL (ref 0–149)
VLDLC SERPL CALC-MCNC: 22 MG/DL (ref 5–40)

## 2019-11-11 PROCEDURE — 90471 IMMUNIZATION ADMIN: CPT | Performed by: FAMILY MEDICINE

## 2019-11-11 PROCEDURE — 90686 IIV4 VACC NO PRSV 0.5 ML IM: CPT | Performed by: FAMILY MEDICINE

## 2019-11-19 ENCOUNTER — TELEPHONE (OUTPATIENT)
Dept: FAMILY MEDICINE CLINIC | Facility: CLINIC | Age: 34
End: 2019-11-19

## 2019-11-19 NOTE — TELEPHONE ENCOUNTER
Well first of all patients don't go to nephrologists for recurrent UTIs, they go to urologists. She has a history of a kidney stone so I would assume that she already has a urologist. She didn't say anything to me about having recurrent UTIs and she was recently pregnant which can put some people at risk of UTIs. Finally I see nothing in Epic with recurrent visits to UCs or ERs with UTIs. So, bottom line, I need a little more history. Please call the patient and get more information.

## 2019-11-20 NOTE — TELEPHONE ENCOUNTER
Patient stated she wants to see them because she has Lupus and because she keeps getting kidney stones and the urologist is just telling her that's just how it is with some people

## 2019-11-20 NOTE — TELEPHONE ENCOUNTER
The nephrologist will not see her for either of those reasons. She can see a different urologist for the stones and get a different opinion.

## 2020-06-08 ENCOUNTER — TELEMEDICINE CONVERTED (OUTPATIENT)
Dept: CARDIOLOGY | Facility: CLINIC | Age: 35
End: 2020-06-08
Attending: INTERNAL MEDICINE

## 2021-05-13 NOTE — PROGRESS NOTES
Progress Note      Patient Name: Aster Rosenberg   Patient ID: [id number]   Sex: Female   YOB: 1985    Primary Care Provider: Alicia Villafana MD   Referring Provider: Tiffanie Marrero MD    Visit Date: June 8, 2020    Provider: Tiffanie Marrero MD   Location: [facility]   Location Address: [address] [place] [address]   MONA Luna  [id number]   Location Phone: [phone]          Chief Complaint     Occasional palpitations.       History Of Present Illness  TELEHEALTH TELEPHONE VISIT  Aster Rosenberg is a 35 year old Other Race female with a history of intermittent palpitations and thyroid disorder who is doing much better than before. Her palpitations are very self-limiting and rare. They get worse when her thyroid goes out of control and she gets hyperthyroid. Otherwise, she is doing better. She has had rare episodes of palpitations over the last few months. She is presenting for evaluation via telehealth telephone visit. Verbal consent obtained before beginning visit. Telehealth visit due to COVID-19.   Provider spent 5-6 minutes with the patient during the telehealth visit.   The following staff were present during this visit: Provider only.   PAST MEDICAL HISTORY: Hypothyroidism.   FAMILY HISTORY: Positive for diabetes mellitus. Negative for hypertension or heart disease.   PSYCHOSOCIAL HISTORY: Denies tobacco use. Rarely consumes alcohol. Denies mood changes or depression.   CURRENT MEDICATIONS: Propranolol 10 mg b.i.d. and she will take anotherone during the day if she has sustained palpitations; Plaquenil 200 mg b.i.d.; Unithroid 150 mcg daily; cytomel 5 mg b.i.d.; prednisone 5 mg b.i.d., being tapered down. Dosage and frequency of the medications reviewed with the patient.       Review of Systems  · Cardiovascular  o Admits  o : palpitations (fast, fluttering, or skipping beats)  o Denies  o : swelling (feet, ankles, hands), shortness of breath while walking or lying flat,  chest pain or angina pectoris   · Respiratory  o Denies  o : chronic or frequent cough, asthma or wheezing      Vitals            Physical Examination  · Labs  o Labs  o : She forgot to get her blood work done.          Assessment     1.  Palpitations, intermittent, much improved.  2.  Hypothyroidism, undergoing treatment.       Plan     1.  Obtain blood work in the next few weeks when she is going to get her thyroid panel done.  She will get a        CBC, chemistry panel, and lipid panel.    2.  Follow up in 9 months.      Tiffanie Marrero MD, [facility]  PM/             Electronically Signed by: Catarina Eason-, Other -Author on June 12, 2020 08:41:16 AM  Electronically Co-signed by: Tiffanie Marrero MD -Reviewer on June 20, 2020 10:45:48 PM

## 2021-05-16 VITALS
HEART RATE: 70 BPM | SYSTOLIC BLOOD PRESSURE: 114 MMHG | WEIGHT: 148 LBS | DIASTOLIC BLOOD PRESSURE: 86 MMHG | HEIGHT: 62 IN | BODY MASS INDEX: 27.23 KG/M2

## 2021-05-16 VITALS
HEIGHT: 62 IN | SYSTOLIC BLOOD PRESSURE: 122 MMHG | WEIGHT: 156 LBS | DIASTOLIC BLOOD PRESSURE: 90 MMHG | HEART RATE: 72 BPM | BODY MASS INDEX: 28.71 KG/M2

## 2021-07-12 RX ORDER — PROPRANOLOL HYDROCHLORIDE 10 MG/1
TABLET ORAL
Qty: 90 TABLET | Refills: 1 | Status: SHIPPED | OUTPATIENT
Start: 2021-07-12 | End: 2021-12-27

## 2021-07-21 ENCOUNTER — OFFICE VISIT (OUTPATIENT)
Dept: CARDIOLOGY | Facility: CLINIC | Age: 36
End: 2021-07-21

## 2021-07-21 VITALS
HEIGHT: 63 IN | DIASTOLIC BLOOD PRESSURE: 72 MMHG | HEART RATE: 76 BPM | SYSTOLIC BLOOD PRESSURE: 122 MMHG | BODY MASS INDEX: 29.59 KG/M2 | WEIGHT: 167 LBS

## 2021-07-21 DIAGNOSIS — E03.8 ADULT ONSET HYPOTHYROIDISM: Primary | ICD-10-CM

## 2021-07-21 DIAGNOSIS — R00.2 PALPITATIONS: ICD-10-CM

## 2021-07-21 PROCEDURE — 99213 OFFICE O/P EST LOW 20 MIN: CPT | Performed by: INTERNAL MEDICINE

## 2021-07-21 RX ORDER — NORETHINDRONE 0.35 MG/1
0.35 TABLET ORAL DAILY
COMMUNITY
Start: 2021-07-11 | End: 2023-03-28

## 2021-07-21 RX ORDER — ERGOCALCIFEROL 1.25 MG/1
50000 CAPSULE ORAL 3 TIMES WEEKLY
COMMUNITY
Start: 2021-05-28 | End: 2022-05-04

## 2021-07-21 RX ORDER — MELOXICAM 15 MG/1
15 TABLET ORAL DAILY
COMMUNITY
Start: 2021-05-27 | End: 2022-05-04

## 2021-07-21 NOTE — PROGRESS NOTES
"Chief Complaint  Follow-up and Palpitations (not surrently having right now)    Subjective            Aster Rosenberg presents to [facility]  MsRocio Rosenberg is a 36-year-old female with symptomatic palpitations that are much improved. She has a 5-month-old 2nd child and since the delivery she has not much problems with palpitations. She often forgets to take her 2nd dose of propranolol.      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   • PONV (postoperative nausea and vomiting)    • Thyroid disease     hypothyroid       Allergies   Allergen Reactions   • Insulin Detemir Hives     Localized reaction   • Iodinated Diagnostic Agents Hives   • Sulfa Antibiotics      Lips breakout, swelling   • Tape      Skin irritation for periods of time        Past Surgical History:   Procedure Laterality Date   • CYSTOSCOPY W/ URETERAL STENT PLACEMENT     • D & C WITH SUCTION  10/24/2017    missed Ab   • EXTRACORPOREAL SHOCK WAVE LITHOTRIPSY (ESWL) Left 4/28/2017    Procedure: LT EXTRACORPOREAL SHOCKWAVE LITHOTRIPSY;  Surgeon: Adryan Limon MD;  [place] [facility] [place] [facility] [place]:    • KIDNEY STONE SURGERY          Social History     Tobacco Use   • Smoking status: Never Smoker   • Smokeless tobacco: Never Used   Vaping Use   • Vaping Use: Never used   Substance Use Topics   • Alcohol use: Yes     Comment: rarely   • Drug use: Never       Family History   Problem Relation Age of Onset   • Diabetes Mother    • Depression Brother    • Lupus Maternal Aunt    • Diabetes Maternal Grandmother    • Breast cancer Paternal Grandmother         Prior to Admission medications    Medication Sig Start Date End Date Taking? " "Authorizing Provider   ergocalciferol (ERGOCALCIFEROL) 1.25 MG (29207 UT) capsule Take 50,000 Units by mouth 3 (Three) Times a Week. 5/28/21  Yes Michell Persaud MD   hydroxychloroquine (PLAQUENIL) 200 MG tablet Take 200 mg by mouth 2 (Two) Times a Day.   Yes Michell Persaud MD   Jencycla 0.35 MG tablet Take 0.35 mg by mouth Daily. 7/11/21  Yes Michell Persaud MD   liothyronine (CYTOMEL) 5 MCG tablet Take 5 mcg by mouth Daily. 4/1/17  Yes Michell Persaud MD   meloxicam (MOBIC) 15 MG tablet Take 15 mg by mouth Daily. 5/27/21  Yes Michell Persaud MD   [id number] PO Take  by mouth Daily.   Yes Michell Persaud MD   propranolol (INDERAL) 10 MG tablet TAKE ONE TABLET BY MOUTH THREE TIMES A DAY 7/12/21  Yes Aster Rosenberg APRN   Unithroid 125 MCG tablet Take 125 mcg by mouth Daily. 10/26/19  Yes Michell Persaud MD   Levonorgestrel-Ethinyl Estrad (NORDETTE, 21, PO) Take  by mouth.    ProviderMichell MD        Review of Systems   Constitutional: Negative for fatigue.   Respiratory: Negative for cough and shortness of breath.    Cardiovascular: Positive for palpitations. Negative for chest pain and leg swelling.   Neurological: Positive for dizziness.        Objective     /72   Pulse 76   Ht 160 cm (63\")   Wt 75.8 kg (167 lb)   BMI 29.58 kg/m²       Physical Exam  Constitutional:       General: She is awake.      Appearance: Normal appearance.   Neck:      Thyroid: No thyromegaly.      Vascular: No carotid bruit or JVD.   Cardiovascular:      Rate and Rhythm: Normal rate and regular rhythm.      Chest Wall: PMI is not displaced.      Pulses: Normal pulses.      Heart sounds: S1 normal and S2 normal. Murmur (Positive systolic murmur at the apex) heard.   Systolic murmur is present.   No friction rub. No gallop. No S3 or S4 sounds.    Pulmonary:      Effort: Pulmonary effort is normal.      Breath sounds: Normal breath sounds and air entry. No " wheezing, rhonchi or rales.   Abdominal:      General: Bowel sounds are normal.      Palpations: Abdomen is soft. There is no mass.      Tenderness: There is no abdominal tenderness.   Musculoskeletal:      Cervical back: Neck supple.      Right lower leg: No edema.      Left lower leg: No edema.   Neurological:      Mental Status: She is alert and oriented to person, place, and time.   Psychiatric:         Mood and Affect: Mood normal.         Behavior: Behavior is cooperative.           Result Review :                           Assessment and Plan        Diagnoses and all orders for this visit:    1. Adult onset hypothyroidism (Primary)  -     Lipid Panel; Future  -     CBC & Differential; Future  -     Comprehensive Metabolic Panel; Future  -     Magnesium; Future    2. Palpitations  Assessment & Plan:  Patient's palpitations are mild and intermittent. Sometimes she forgets to take them evening dose of propranolol and she could just fine. At times she may remember that she has not taken it when she feels up but is and when she takes her propranolol in the evening to go away within a short time. If her thyroid is not controlled or symptomatic palpitations are much worse.    Orders:  -     Lipid Panel; Future  -     CBC & Differential; Future  -     Comprehensive Metabolic Panel; Future  -     Magnesium; Future          Follow Up     Return in about 1 year (around 7/21/2022) for with ekg.    Patient was given instructions and counseling regarding her condition or for health maintenance advice. Please see specific information pulled into the AVS if appropriate.

## 2021-07-21 NOTE — ASSESSMENT & PLAN NOTE
Patient's palpitations are mild and intermittent. Sometimes she forgets to take them evening dose of propranolol and she could just fine. At times she may remember that she has not taken it when she feels up but is and when she takes her propranolol in the evening to go away within a short time. If her thyroid is not controlled or symptomatic palpitations are much worse.

## 2021-09-15 ENCOUNTER — TELEPHONE (OUTPATIENT)
Dept: FAMILY MEDICINE CLINIC | Facility: CLINIC | Age: 36
End: 2021-09-15

## 2021-09-15 ENCOUNTER — TELEMEDICINE (OUTPATIENT)
Dept: FAMILY MEDICINE CLINIC | Facility: CLINIC | Age: 36
End: 2021-09-15

## 2021-09-15 DIAGNOSIS — O92.4 DECREASED MILK PRODUCTION: ICD-10-CM

## 2021-09-15 DIAGNOSIS — L72.3 SEBACEOUS CYST: Primary | ICD-10-CM

## 2021-09-15 DIAGNOSIS — R10.9 FLANK PAIN: ICD-10-CM

## 2021-09-15 PROCEDURE — 99213 OFFICE O/P EST LOW 20 MIN: CPT | Performed by: NURSE PRACTITIONER

## 2021-09-15 NOTE — PROGRESS NOTES
Subjective   Aster Rosenberg is a 36 y.o. female.     History of Present Illness   Video visit  Started with a bump on her left thigh this morning that has gotten bigger.She has had MRSA in the past and is concerned. She has not done anything for the discomfort.    Secondly she has a history of kidney stones and has had 3 lithotripsies  And started with some left flank pain a week ago. It is a dull ache and she is drinking plenty of water. No fever or vomiting.    Lastly she has has been breast feeding and cut out all of the dairy because her daughter has terrible allergies and her milk production has decreased and wanted to know what to do.  The following portions of the patient's history were reviewed and updated as appropriate: allergies, current medications, past family history, past medical history, past social history, past surgical history and problem list.    Review of Systems   Constitutional: Negative for activity change, appetite change, fatigue and fever.   HENT: Negative for congestion, ear pain, rhinorrhea, sinus pressure and sinus pain.    Eyes: Negative for discharge.   Respiratory: Negative for cough, choking and shortness of breath.    Cardiovascular: Negative for chest pain.   Gastrointestinal: Negative for nausea.   Genitourinary: Positive for flank pain. Negative for frequency and urgency.   Skin: Positive for wound.   Neurological: Negative for dizziness and headaches.       Objective   Physical Exam  Vitals and nursing note reviewed.   Constitutional:       Appearance: She is well-developed.   HENT:      Head: Normocephalic and atraumatic.   Eyes:      Pupils: Pupils are equal, round, and reactive to light.   Pulmonary:      Effort: Pulmonary effort is normal.   Musculoskeletal:         General: Normal range of motion.   Skin:     General: Skin is warm and dry.      Findings: Lesion present.      Comments: Sebaceous cyst to left thigh posterior   Neurological:      Mental Status: She is alert  and oriented to person, place, and time.         Assessment/Plan   Diagnoses and all orders for this visit:    1. Sebaceous cyst (Primary)    2. Flank pain    3. Decreased milk production       Clindamycin for her cyst and warm compresses.  Call the pediatrician for the milk production issue.  If still with flank pain need to come to the office for an in person visit and a urine check.  The use of a video visit has been reviewed with the patient and verbal informed consent has been obtained.

## 2021-09-15 NOTE — TELEPHONE ENCOUNTER
Caller: Aster Rosenberg    Relationship: Self    Best call back number: [phone]     What medication are you requesting:  ANTIBIOTIC    What are your current symptoms: INFECTION ON HER LEG  VIKTORIA HUGO SAW HER ON 9/15/21 AND WAS GOING TO SEND IN A MEDICATION.    If a prescription is needed, what is your preferred pharmacy and phone number:    [facility] [address] [place] [address] - [address]  - [phone] [facility] [phone] FX

## 2021-09-16 RX ORDER — CEPHALEXIN 500 MG/1
500 CAPSULE ORAL 3 TIMES DAILY
Qty: 21 CAPSULE | Refills: 0 | Status: SHIPPED | OUTPATIENT
Start: 2021-09-16 | End: 2022-04-14

## 2021-12-27 RX ORDER — PROPRANOLOL HYDROCHLORIDE 10 MG/1
TABLET ORAL
Qty: 270 TABLET | Refills: 1 | Status: SHIPPED | OUTPATIENT
Start: 2021-12-27 | End: 2022-06-01 | Stop reason: SDUPTHER

## 2022-04-14 ENCOUNTER — TELEMEDICINE (OUTPATIENT)
Dept: FAMILY MEDICINE CLINIC | Facility: CLINIC | Age: 37
End: 2022-04-14

## 2022-04-14 DIAGNOSIS — J40 BRONCHITIS: Primary | ICD-10-CM

## 2022-04-14 PROCEDURE — 99213 OFFICE O/P EST LOW 20 MIN: CPT | Performed by: NURSE PRACTITIONER

## 2022-04-14 RX ORDER — AMOXICILLIN 875 MG/1
875 TABLET, COATED ORAL 2 TIMES DAILY
Qty: 20 TABLET | Refills: 0 | Status: SHIPPED | OUTPATIENT
Start: 2022-04-14 | End: 2022-04-24

## 2022-04-14 NOTE — PROGRESS NOTES
Subjective   Aster Rosenberg is a 36 y.o. female.     History of Present Illness  Has had allergy type symptoms for about 10 days.She went to the [facility] clinic and they gave her mucinex and prednisone which   Did not help at all. She continues to cough up green sputum.  The following portions of the patient's history were reviewed and updated as appropriate: allergies, current medications, past family history, past medical history, past social history, past surgical history and problem list.    Review of Systems   Constitutional: Negative for activity change, appetite change, fatigue and fever.   HENT: Positive for congestion and postnasal drip. Negative for sinus pressure and sinus pain.    Respiratory: Positive for cough. Negative for shortness of breath.    Neurological: Negative for dizziness and headaches.       Objective   Physical Exam  Vitals and nursing note reviewed.   Constitutional:       Appearance: She is well-developed.   HENT:      Head: Normocephalic and atraumatic.   Eyes:      Pupils: Pupils are equal, round, and reactive to light.   Pulmonary:      Effort: Pulmonary effort is normal.   Musculoskeletal:         General: Normal range of motion.   Skin:     General: Skin is warm and dry.   Neurological:      Mental Status: She is alert and oriented to person, place, and time.         Assessment/Plan   Diagnoses and all orders for this visit:    1. Bronchitis (Primary)    Other orders  -     amoxicillin (AMOXIL) 875 MG tablet; Take 1 tablet by mouth 2 (Two) Times a Day for 10 days.  Dispense: 20 tablet; Refill: 0       Will call the office if she has no improvement.  Discussed returning soon for her annual physical.  The use of a video visit has been reviewed with the patient and verbal informed consent has been obtained.

## 2022-05-04 ENCOUNTER — OFFICE VISIT (OUTPATIENT)
Dept: FAMILY MEDICINE CLINIC | Facility: CLINIC | Age: 37
End: 2022-05-04

## 2022-05-04 VITALS — RESPIRATION RATE: 19 BRPM | OXYGEN SATURATION: 99 % | HEART RATE: 72 BPM

## 2022-05-04 DIAGNOSIS — R05.9 COUGH: Primary | ICD-10-CM

## 2022-05-04 PROCEDURE — 99213 OFFICE O/P EST LOW 20 MIN: CPT | Performed by: FAMILY MEDICINE

## 2022-05-04 RX ORDER — LEVOTHYROXINE SODIUM 175 MCG
175 TABLET ORAL DAILY
COMMUNITY
Start: 2022-04-15

## 2022-05-04 RX ORDER — GABAPENTIN 300 MG/1
CAPSULE ORAL
COMMUNITY
Start: 2022-03-08

## 2022-05-04 RX ORDER — BENZONATATE 100 MG/1
100 CAPSULE ORAL 3 TIMES DAILY PRN
Qty: 60 CAPSULE | Refills: 2 | Status: SHIPPED | OUTPATIENT
Start: 2022-05-04 | End: 2022-05-24

## 2022-05-04 NOTE — PROGRESS NOTES
Chief Complaint  Cough (Dry cough, tightness in chest and throat ) and Nasal Congestion    Subjective    History of Present Illness {CC  Problem List  Visit  Diagnosis   Encounters  Notes  Medications  Labs  Result Review Imaging  Media :23}     Aster Rosenberg presents to [facility] for Cough (Dry cough, tightness in chest and throat ) and Nasal Congestion.  History of Present Illness     Here with complaints of dry cough that is been lingering for about a month now and occasional nasal congestion. Started with rather severe URI symptoms. Was flu and COVID-negative at the time. No further fevers or chills, no other symptoms beyond the cough really. Cough is nonproductive, most bothersome overnight. Has not found any over-the-counter intervention that helps with cough prevention. Has become quite bothersome. Worried about possible pneumonia as she had pneumonia last fall.    Objective     Vital Signs:   Pulse 72   Resp 19   SpO2 99%   Physical Exam  Vitals and nursing note reviewed.   Constitutional:       General: She is not in acute distress.     Appearance: Normal appearance. She is not ill-appearing.   Cardiovascular:      Rate and Rhythm: Normal rate and regular rhythm.      Pulses: Normal pulses.      Heart sounds: Normal heart sounds. No murmur heard.  Pulmonary:      Effort: Pulmonary effort is normal. No respiratory distress.      Breath sounds: Normal breath sounds. No wheezing, rhonchi or rales.   Neurological:      Mental Status: She is alert and oriented to person, place, and time. Mental status is at baseline.   Psychiatric:         Mood and Affect: Mood normal.         Behavior: Behavior normal.          Result Review  Data Reviewed:{ Labs  Result Review  Imaging  Med Tab  Media :23}                   Assessment and Plan {CC Problem List  Visit Diagnosis  ROS  Review (Popup)  Health Maintenance  Quality  BestPractice  Medications  SmartSets   SnapShot Encounters  Media :23}   Diagnoses and all orders for this visit:    1. Cough (Primary)  -     benzonatate (Tessalon Perles) 100 MG capsule; Take 1 capsule by mouth 3 (Three) Times a Day As Needed for Cough.  Dispense: 60 capsule; Refill: 2  -     Dextromethorphan-guaiFENesin  MG/15ML liquid; Take 15 mL by mouth At Night As Needed (cough).  Dispense: 354 mL; Refill: 2    Will try the above interventions for cough. Also recommended a teaspoon of honey at bedtime. Anticipate resolution with time.    Patient was given instructions and counseling regarding her condition or for health maintenance advice. Please see specific information pulled into the AVS (placed there by myself) if appropriate.    Return if symptoms worsen or fail to improve.      HOMERO Fish MD

## 2022-05-07 ENCOUNTER — TELEPHONE (OUTPATIENT)
Dept: FAMILY MEDICINE CLINIC | Facility: CLINIC | Age: 37
End: 2022-05-07

## 2022-05-07 NOTE — TELEPHONE ENCOUNTER
Aster called on call on 5/7/2022 at 3 pm with new dx of COVID by PCR. She is symptomatic with fever and cough. She is on immunosuppressants. I reviewed with Dr. Villafana and she will order monoclonal antibodies on Monday, 5/9/202.  I have advised Aster to call me or go to the ER if she gets short of air.  I have encouraged her to call me if she has any other symptoms that are worrying to her.  She has a fever that has gotten a 101.8 and is responding well to Tylenol.  I have encouraged her to continue to take Tylenol and cough medication as needed and call me if she gets concerned about any.  She expressed understanding.

## 2022-05-09 ENCOUNTER — TRANSCRIBE ORDERS (OUTPATIENT)
Dept: ADMINISTRATIVE | Facility: HOSPITAL | Age: 37
End: 2022-05-09

## 2022-05-09 DIAGNOSIS — U07.1 CLINICAL DIAGNOSIS OF SEVERE ACUTE RESPIRATORY SYNDROME CORONAVIRUS 2 (SARS-COV-2) DISEASE: Primary | ICD-10-CM

## 2022-05-09 RX ORDER — BEBTELOVIMAB 87.5 MG/ML
175 INJECTION, SOLUTION INTRAVENOUS ONCE
Status: CANCELLED | OUTPATIENT
Start: 2022-05-10

## 2022-05-09 RX ORDER — DIPHENHYDRAMINE HYDROCHLORIDE 50 MG/ML
50 INJECTION INTRAMUSCULAR; INTRAVENOUS ONCE AS NEEDED
Status: CANCELLED | OUTPATIENT
Start: 2022-05-10

## 2022-05-09 RX ORDER — EPINEPHRINE 1 MG/ML
0.3 INJECTION, SOLUTION, CONCENTRATE INTRAVENOUS AS NEEDED
Status: CANCELLED | OUTPATIENT
Start: 2022-05-10

## 2022-05-09 RX ORDER — SODIUM CHLORIDE 9 MG/ML
30 INJECTION, SOLUTION INTRAVENOUS ONCE
Status: CANCELLED | OUTPATIENT
Start: 2022-05-10 | End: 2022-05-10

## 2022-05-09 RX ORDER — METHYLPREDNISOLONE SODIUM SUCCINATE 125 MG/2ML
125 INJECTION, POWDER, LYOPHILIZED, FOR SOLUTION INTRAMUSCULAR; INTRAVENOUS AS NEEDED
Status: CANCELLED | OUTPATIENT
Start: 2022-05-10

## 2022-05-09 RX ORDER — DIPHENHYDRAMINE HCL 50 MG
50 CAPSULE ORAL ONCE AS NEEDED
Status: CANCELLED | OUTPATIENT
Start: 2022-05-10

## 2022-05-09 NOTE — TELEPHONE ENCOUNTER
Spoke to patient she is agreeable to do antibody infusion form filled out and faxed to scheduling they will call patient and get her scheduled

## 2022-05-10 ENCOUNTER — HOSPITAL ENCOUNTER (OUTPATIENT)
Dept: INFUSION THERAPY | Facility: HOSPITAL | Age: 37
Discharge: HOME | End: 2022-05-10
Admitting: FAMILY MEDICINE

## 2022-05-10 VITALS
SYSTOLIC BLOOD PRESSURE: 99 MMHG | RESPIRATION RATE: 18 BRPM | HEART RATE: 71 BPM | DIASTOLIC BLOOD PRESSURE: 64 MMHG | OXYGEN SATURATION: 100 % | TEMPERATURE: 97.7 F

## 2022-05-10 DIAGNOSIS — U07.1 CLINICAL DIAGNOSIS OF COVID-19: Primary | ICD-10-CM

## 2022-05-10 PROCEDURE — M0222 HC INJECTION BEBTELOVIMAB: HCPCS | Performed by: FAMILY MEDICINE

## 2022-05-10 PROCEDURE — 96374 THER/PROPH/DIAG INJ IV PUSH: CPT

## 2022-05-10 PROCEDURE — 25010000002 INJECTION, BEBTELOVIMAB, 175 MG: Performed by: FAMILY MEDICINE

## 2022-05-10 RX ORDER — SODIUM CHLORIDE 9 MG/ML
30 INJECTION, SOLUTION INTRAVENOUS ONCE
Status: DISCONTINUED | OUTPATIENT
Start: 2022-05-10 | End: 2022-05-12 | Stop reason: HOSPADM

## 2022-05-10 RX ORDER — BEBTELOVIMAB 87.5 MG/ML
175 INJECTION, SOLUTION INTRAVENOUS ONCE
Status: CANCELLED | OUTPATIENT
Start: 2022-05-10

## 2022-05-10 RX ORDER — DIPHENHYDRAMINE HCL 25 MG
50 CAPSULE ORAL ONCE AS NEEDED
OUTPATIENT
Start: 2022-05-10

## 2022-05-10 RX ORDER — SODIUM CHLORIDE 9 MG/ML
30 INJECTION, SOLUTION INTRAVENOUS ONCE
Status: CANCELLED | OUTPATIENT
Start: 2022-05-10 | End: 2022-05-10

## 2022-05-10 RX ORDER — DIPHENHYDRAMINE HCL 25 MG
50 CAPSULE ORAL ONCE AS NEEDED
Status: DISCONTINUED | OUTPATIENT
Start: 2022-05-10 | End: 2022-05-12 | Stop reason: HOSPADM

## 2022-05-10 RX ORDER — BEBTELOVIMAB 87.5 MG/ML
175 INJECTION, SOLUTION INTRAVENOUS ONCE
Status: COMPLETED | OUTPATIENT
Start: 2022-05-10 | End: 2022-05-10

## 2022-05-10 RX ORDER — METHYLPREDNISOLONE SODIUM SUCCINATE 125 MG/2ML
125 INJECTION, POWDER, LYOPHILIZED, FOR SOLUTION INTRAMUSCULAR; INTRAVENOUS AS NEEDED
Status: DISCONTINUED | OUTPATIENT
Start: 2022-05-10 | End: 2022-05-12 | Stop reason: HOSPADM

## 2022-05-10 RX ORDER — DIPHENHYDRAMINE HYDROCHLORIDE 50 MG/ML
50 INJECTION INTRAMUSCULAR; INTRAVENOUS ONCE AS NEEDED
Status: DISCONTINUED | OUTPATIENT
Start: 2022-05-10 | End: 2022-05-12 | Stop reason: HOSPADM

## 2022-05-10 RX ORDER — METHYLPREDNISOLONE SODIUM SUCCINATE 125 MG/2ML
125 INJECTION, POWDER, LYOPHILIZED, FOR SOLUTION INTRAMUSCULAR; INTRAVENOUS AS NEEDED
OUTPATIENT
Start: 2022-05-10

## 2022-05-10 RX ORDER — DIPHENHYDRAMINE HYDROCHLORIDE 50 MG/ML
50 INJECTION INTRAMUSCULAR; INTRAVENOUS ONCE AS NEEDED
OUTPATIENT
Start: 2022-05-10

## 2022-05-10 RX ADMIN — BEBTELOVIMAB 175 MG: 87.5 INJECTION, SOLUTION INTRAVENOUS at 13:22

## 2022-05-10 NOTE — PROGRESS NOTES
Patient provided with Fact Sheet for Patients, Parents and Caregivers Emergency Use Authorization (EUA) of bebtelovimab for Coronavirus Disease 2019 (COVID-19) form.    Reviewed and patient verbalized understanding.  Appropriate PPE worn during the care of the patient.  Advised patient not to receive Covid vaccine for 90 days.

## 2022-05-24 ENCOUNTER — OFFICE VISIT (OUTPATIENT)
Dept: FAMILY MEDICINE CLINIC | Facility: CLINIC | Age: 37
End: 2022-05-24

## 2022-05-24 VITALS
BODY MASS INDEX: 27.46 KG/M2 | SYSTOLIC BLOOD PRESSURE: 112 MMHG | OXYGEN SATURATION: 97 % | DIASTOLIC BLOOD PRESSURE: 82 MMHG | HEART RATE: 84 BPM | WEIGHT: 155 LBS | HEIGHT: 63 IN

## 2022-05-24 DIAGNOSIS — R53.83 FATIGUE, UNSPECIFIED TYPE: ICD-10-CM

## 2022-05-24 DIAGNOSIS — R42 DIZZINESS: Primary | ICD-10-CM

## 2022-05-24 DIAGNOSIS — G43.019 INTRACTABLE MIGRAINE WITHOUT AURA AND WITHOUT STATUS MIGRAINOSUS: ICD-10-CM

## 2022-05-24 PROBLEM — I95.9 LOW BLOOD PRESSURE: Status: ACTIVE | Noted: 2022-05-07

## 2022-05-24 PROBLEM — Z87.442 HISTORY OF RENAL CALCULI: Status: ACTIVE | Noted: 2022-05-07

## 2022-05-24 PROCEDURE — 99214 OFFICE O/P EST MOD 30 MIN: CPT | Performed by: NURSE PRACTITIONER

## 2022-05-24 RX ORDER — RIZATRIPTAN BENZOATE 10 MG/1
10 TABLET ORAL ONCE AS NEEDED
Qty: 10 TABLET | Refills: 1 | Status: SHIPPED | OUTPATIENT
Start: 2022-05-24 | End: 2023-03-28

## 2022-05-24 RX ORDER — ACETAMINOPHEN 325 MG/1
TABLET ORAL
COMMUNITY
End: 2023-03-28

## 2022-05-24 NOTE — PROGRESS NOTES
Subjective   Aster Rosenberg is a 36 y.o. female.     History of Present Illness   Patient presents for c/o 4 day migraine post covid. She reports that she tested positive on Derby day. She reports that both of her children got it.  She reports that she initially was better, but now feels worse. She reports brain fog, dizziness, fatigue and headaches. Patient does have lupus and reports that she can tell it is acting up since them.    The following portions of the patient's history were reviewed and updated as appropriate: allergies, current medications, past family history, past medical history, past social history, past surgical history and problem list.    Review of Systems   Constitutional: Positive for fatigue. Negative for chills and fever.   Respiratory: Negative for cough, chest tightness, shortness of breath and wheezing.    Cardiovascular: Negative for chest pain, palpitations and leg swelling.   Musculoskeletal: Positive for arthralgias.   Neurological: Positive for headache and memory problem. Negative for dizziness.   Hematological: Negative.    Psychiatric/Behavioral: Negative.  Negative for sleep disturbance.       Objective   Physical Exam  Vitals and nursing note reviewed.   Constitutional:       Appearance: Normal appearance. She is well-developed and overweight.   HENT:      Head: Normocephalic and atraumatic.   Eyes:      Conjunctiva/sclera: Conjunctivae normal.      Pupils: Pupils are equal, round, and reactive to light.   Cardiovascular:      Rate and Rhythm: Normal rate and regular rhythm.      Heart sounds: Normal heart sounds. No murmur heard.  Pulmonary:      Effort: Pulmonary effort is normal.      Breath sounds: Normal breath sounds.   Neurological:      Mental Status: She is alert and oriented to person, place, and time.   Psychiatric:         Behavior: Behavior normal.         Thought Content: Thought content normal.         Judgment: Judgment normal.         Vitals:    05/24/22 1529    BP: 112/82   Pulse: 84   SpO2: 97%     Body mass index is 27.46 kg/m².    Procedures    Assessment & Plan   Problems Addressed this Visit    None     Visit Diagnoses     Dizziness    -  Primary    Relevant Orders    Comprehensive Metabolic Panel    Fatigue, unspecified type        Relevant Orders    C-reactive Protein    Sedimentation Rate    CBC & Differential    Intractable migraine without aura and without status migrainosus        Relevant Medications    acetaminophen (TYLENOL) 325 MG tablet    rizatriptan (Maxalt) 10 MG tablet      Diagnoses       Codes Comments    Dizziness    -  Primary ICD-10-CM: R42  ICD-9-CM: 780.4     Fatigue, unspecified type     ICD-10-CM: R53.83  ICD-9-CM: 780.79     Intractable migraine without aura and without status migrainosus     ICD-10-CM: G43.019  ICD-9-CM: 346.11         CRP  Sed rate  CBC  CMP  Maxalt 10mg 1p.o. Qd  Follow-up with Rheumatology as discussed.        Return if symptoms worsen or fail to improve.

## 2022-05-24 NOTE — PATIENT INSTRUCTIONS
Return if symptoms worsen or fail to improve.  Call with any questions or concerns.   Follow-up with Rheumatology as discussed.

## 2022-05-25 LAB
ALBUMIN SERPL-MCNC: 4.6 G/DL (ref 3.8–4.8)
ALBUMIN/GLOB SERPL: 1.7 {RATIO} (ref 1.2–2.2)
ALP SERPL-CCNC: 86 IU/L (ref 44–121)
ALT SERPL-CCNC: 33 IU/L (ref 0–32)
AST SERPL-CCNC: 24 IU/L (ref 0–40)
BASOPHILS # BLD AUTO: 0 X10E3/UL (ref 0–0.2)
BASOPHILS NFR BLD AUTO: 1 %
BILIRUB SERPL-MCNC: 0.2 MG/DL (ref 0–1.2)
BUN SERPL-MCNC: 12 MG/DL (ref 6–20)
BUN/CREAT SERPL: 24 (ref 9–23)
CALCIUM SERPL-MCNC: 9.6 MG/DL (ref 8.7–10.2)
CHLORIDE SERPL-SCNC: 103 MMOL/L (ref 96–106)
CO2 SERPL-SCNC: 25 MMOL/L (ref 20–29)
CREAT SERPL-MCNC: 0.51 MG/DL (ref 0.57–1)
CRP SERPL-MCNC: <1 MG/L (ref 0–10)
EGFRCR SERPLBLD CKD-EPI 2021: 126 ML/MIN/1.73
EOSINOPHIL # BLD AUTO: 0.1 X10E3/UL (ref 0–0.4)
EOSINOPHIL NFR BLD AUTO: 2 %
ERYTHROCYTE [DISTWIDTH] IN BLOOD BY AUTOMATED COUNT: 12.6 % (ref 11.7–15.4)
ERYTHROCYTE [SEDIMENTATION RATE] IN BLOOD BY WESTERGREN METHOD: 11 MM/HR (ref 0–32)
GLOBULIN SER CALC-MCNC: 2.7 G/DL (ref 1.5–4.5)
GLUCOSE SERPL-MCNC: 69 MG/DL (ref 65–99)
HCT VFR BLD AUTO: 42.3 % (ref 34–46.6)
HGB BLD-MCNC: 13.6 G/DL (ref 11.1–15.9)
IMM GRANULOCYTES # BLD AUTO: 0 X10E3/UL (ref 0–0.1)
IMM GRANULOCYTES NFR BLD AUTO: 0 %
LYMPHOCYTES # BLD AUTO: 1.8 X10E3/UL (ref 0.7–3.1)
LYMPHOCYTES NFR BLD AUTO: 28 %
MCH RBC QN AUTO: 27.6 PG (ref 26.6–33)
MCHC RBC AUTO-ENTMCNC: 32.2 G/DL (ref 31.5–35.7)
MCV RBC AUTO: 86 FL (ref 79–97)
MONOCYTES # BLD AUTO: 0.8 X10E3/UL (ref 0.1–0.9)
MONOCYTES NFR BLD AUTO: 12 %
NEUTROPHILS # BLD AUTO: 3.6 X10E3/UL (ref 1.4–7)
NEUTROPHILS NFR BLD AUTO: 57 %
PLATELET # BLD AUTO: 288 X10E3/UL (ref 150–450)
POTASSIUM SERPL-SCNC: 4.2 MMOL/L (ref 3.5–5.2)
PROT SERPL-MCNC: 7.3 G/DL (ref 6–8.5)
RBC # BLD AUTO: 4.92 X10E6/UL (ref 3.77–5.28)
SODIUM SERPL-SCNC: 141 MMOL/L (ref 134–144)
WBC # BLD AUTO: 6.4 X10E3/UL (ref 3.4–10.8)

## 2022-06-01 ENCOUNTER — OFFICE VISIT (OUTPATIENT)
Dept: CARDIOLOGY | Facility: CLINIC | Age: 37
End: 2022-06-01

## 2022-06-01 VITALS
BODY MASS INDEX: 27.61 KG/M2 | HEIGHT: 63 IN | OXYGEN SATURATION: 97 % | DIASTOLIC BLOOD PRESSURE: 60 MMHG | WEIGHT: 155.8 LBS | SYSTOLIC BLOOD PRESSURE: 117 MMHG | HEART RATE: 91 BPM

## 2022-06-01 DIAGNOSIS — R00.2 PALPITATIONS: Primary | ICD-10-CM

## 2022-06-01 DIAGNOSIS — E03.8 ADULT ONSET HYPOTHYROIDISM: ICD-10-CM

## 2022-06-01 PROCEDURE — 99213 OFFICE O/P EST LOW 20 MIN: CPT | Performed by: INTERNAL MEDICINE

## 2022-06-01 PROCEDURE — 93000 ELECTROCARDIOGRAM COMPLETE: CPT | Performed by: INTERNAL MEDICINE

## 2022-06-01 RX ORDER — PROPRANOLOL HYDROCHLORIDE 10 MG/1
10 TABLET ORAL 2 TIMES DAILY
Qty: 180 TABLET | Refills: 3 | Status: SHIPPED | OUTPATIENT
Start: 2022-06-01

## 2022-06-01 NOTE — ASSESSMENT & PLAN NOTE
She has had symptomatic palpitations for quite some time.  These get worse when her thyroid medication is a little too much.  Her dosage was recently adjusted.  I have asked her to take her propranolol 10 mg twice a day instead of 1 tablet a day.

## 2022-06-01 NOTE — PROGRESS NOTES
"Office Visit    Chief Complaint  Palpitations    Subjective            Aster Rosenberg presents to [facility]    Is a 36 years old female with longstanding history of symptomatic palpitations prior history of hyperthyroidism and subsequent hypothyroidism due to radioactive iodine treatment who is doing well in general.  She rarely has palpitations and when she does she takes an extra propranolol.  These are generally self-limiting.      Past Medical History:   Diagnosis Date   • Heart abnormality     \"tendon floating in heart\"  takes Inderal   • History of D&C    • History of migraine    • History of MRSA infection     x3, last infection 3-2017, low buttock at panty leg line, treated by PCP   • Kidney stone     Left   • Lupus (HCC)     cutaneous, not treated   • PONV (postoperative nausea and vomiting)    • Thyroid disease     hypothyroid       Allergies   Allergen Reactions   • Insulin Detemir Hives     Localized reaction   • Iodinated Diagnostic Agents Hives   • Sulfa Antibiotics Swelling     Lips breakout, swelling   • Tape Rash     Skin irritation for periods of time        Past Surgical History:   Procedure Laterality Date   • CYSTOSCOPY W/ URETERAL STENT PLACEMENT     • D & C WITH SUCTION  10/24/2017    missed Ab   • EXTRACORPOREAL SHOCK WAVE LITHOTRIPSY (ESWL) Left 4/28/2017    Procedure: LT EXTRACORPOREAL SHOCKWAVE LITHOTRIPSY;  Surgeon: Adryan Limon MD;  Location: [facility] OR [facility] [place];  Service:    • KIDNEY STONE SURGERY          Social History     Tobacco Use   • Smoking status: Never Smoker   • Smokeless tobacco: Never Used   Vaping Use   • Vaping Use: Never used   Substance Use Topics   • Alcohol use: Yes     Comment: rarely   • Drug use: Never       Family History   Problem Relation Age of Onset   • Diabetes Mother    • Depression Brother    • Lupus Maternal Aunt    • Diabetes Maternal Grandmother    • Breast cancer Paternal Grandmother         Prior to Admission medications  " "  Medication Sig Start Date End Date Taking? Authorizing Provider   gabapentin (NEURONTIN) 300 MG capsule  3/8/22  Yes Michell Persaud MD   hydroxychloroquine (PLAQUENIL) 200 MG tablet Take 200 mg by mouth 2 (Two) Times a Day.   Yes Michell Persaud MD   Jencycla 0.35 MG tablet Take 0.35 mg by mouth Daily. 7/11/21  Yes Michell Persaud MD   [id number] PO Take  by mouth Daily.   Yes Michell Persaud MD   propranolol (INDERAL) 10 MG tablet Take 1 tablet by mouth 2 (Two) Times a Day. 6/1/22  Yes Tiffanie Marrero MD   rizatriptan (Maxalt) 10 MG tablet Take 1 tablet by mouth 1 (One) Time As Needed for Migraine. May repeat in 2 hours if needed 5/24/22  Yes Hollie Brumfield APRN   Synthroid 175 MCG tablet Take 175 mcg by mouth Daily. 4/15/22  Yes Michell Persaud MD   propranolol (INDERAL) 10 MG tablet TAKE ONE TABLET BY MOUTH THREE TIMES A DAY 12/27/21 6/1/22 Yes Aster Rosenberg APRN   acetaminophen (TYLENOL) 325 MG tablet Take  by mouth.    Michell Persaud MD   liothyronine (CYTOMEL) 5 MCG tablet Take 5 mcg by mouth Daily. 4/1/17   Michell Persaud MD        Review of Systems   Respiratory: Negative for cough and shortness of breath.    Cardiovascular: Negative for chest pain, palpitations and leg swelling.        Objective     /60   Pulse 91   Ht 160 cm (63\")   Wt 70.7 kg (155 lb 12.8 oz)   SpO2 97%   BMI 27.60 kg/m²       Physical Exam  Constitutional:       General: She is awake.      Appearance: Normal appearance.   Neck:      Thyroid: No thyromegaly.      Vascular: No carotid bruit or JVD.   Cardiovascular:      Rate and Rhythm: Normal rate and regular rhythm.      Chest Wall: PMI is not displaced.      Pulses: Normal pulses.      Heart sounds: Normal heart sounds, S1 normal and S2 normal. No murmur heard.    No friction rub. No gallop. No S3 or S4 sounds.   Pulmonary:      Effort: Pulmonary effort is normal.      Breath sounds: Normal breath " sounds and air entry. No wheezing, rhonchi or rales.   Abdominal:      General: Bowel sounds are normal.      Palpations: Abdomen is soft. There is no mass.      Tenderness: There is no abdominal tenderness.   Musculoskeletal:      Cervical back: Neck supple.      Right lower leg: No edema.      Left lower leg: No edema.   Neurological:      Mental Status: She is alert and oriented to person, place, and time.   Psychiatric:         Mood and Affect: Mood normal.         Behavior: Behavior is cooperative.         No results found for: PROBNP, BNP  CMP    CMP 8/27/21 8/27/21 11/18/21 11/18/21 5/24/22    1604 1604 1558 1558    Glucose     69   BUN     12   Creatinine     0.51 (A)   Sodium     141   Potassium     4.2   Chloride     103   Calcium     9.6   Total Protein     7.3   Albumin     4.6   Globulin     2.7   Total Bilirubin     0.2   Alkaline Phosphatase     86   AST (SGOT) 32  24  24   ALT (SGPT)  27  25 33 (A)   (A) Abnormal value            CBC w/diff    CBC w/Diff 8/27/21 11/18/21 5/24/22   WBC 6.74 5.10 6.4   RBC 4.66 4.44 4.92   Hemoglobin 13.4 12.9 13.6   Hematocrit 41.5 40.0 42.3   MCV 89.1 90.1 86   MCH 28.8 29.1 27.6   MCHC 32.3 32.3 32.2   RDW 12.5 12.7 12.6   Platelets 231 205 288   Neutrophil Rel % 53.4 50.4 57   Immature Granulocyte Rel % 0.3 0.4    Lymphocyte Rel % 34.0 34.5 28   Monocyte Rel % 9.3 11.2 12   Eosinophil Rel % 2.4 2.7 2   Basophil Rel % 0.6 0.8 1               No results found for: TSH   No results found for: FREET4   No results found for: DDIMERQUANT  No results found for: MG   No results found for: DIGOXIN            Result Review :                    ECG 12 Lead    Date/Time: 6/1/2022 3:08 PM  Performed by: Tiffanie Marrero MD  Authorized by: Tiffanie Marrero MD   Comments: Sinus rhythm low voltage precordial leads.  No other abnormality noted.  No change compared to previous EKG                Assessment and Plan        Diagnoses and all orders for this visit:    1. Palpitations  (Primary)  Assessment & Plan:  She has had symptomatic palpitations for quite some time.  These get worse when her thyroid medication is a little too much.  Her dosage was recently adjusted.  I have asked her to take her propranolol 10 mg twice a day instead of 1 tablet a day.      2. Adult onset hypothyroidism  Assessment & Plan:  Dosage was recently adjusted by her endocrinologist.      Other orders  -     propranolol (INDERAL) 10 MG tablet; Take 1 tablet by mouth 2 (Two) Times a Day.  Dispense: 180 tablet; Refill: 3          Follow Up     Return if symptoms worsen or fail to improve.    Patient was given instructions and counseling regarding her condition or for health maintenance advice. Please see specific information pulled into the AVS if appropriate.     Tiffanie Marrero MD  06/01/22 14:45 EDT

## 2023-03-28 ENCOUNTER — TELEMEDICINE (OUTPATIENT)
Dept: FAMILY MEDICINE CLINIC | Facility: CLINIC | Age: 38
End: 2023-03-28
Payer: COMMERCIAL

## 2023-03-28 DIAGNOSIS — M79.621 PAIN, AXILLARY, RIGHT: ICD-10-CM

## 2023-03-28 DIAGNOSIS — M79.622 PAIN, AXILLARY, LEFT: Primary | ICD-10-CM

## 2023-03-28 PROBLEM — Z87.59 HISTORY OF POSTPARTUM DEPRESSION: Status: ACTIVE | Noted: 2022-07-31

## 2023-03-28 PROBLEM — O24.414 GESTATIONAL DIABETES MELLITUS (GDM) REQUIRING INSULIN: Status: ACTIVE | Noted: 2023-01-17

## 2023-03-28 PROBLEM — Z86.59 HISTORY OF POSTPARTUM DEPRESSION: Status: ACTIVE | Noted: 2022-07-31

## 2023-03-28 PROBLEM — O36.5990 INTRAUTERINE GROWTH RESTRICTION (IUGR) AFFECTING CARE OF MOTHER: Status: ACTIVE | Noted: 2023-01-19

## 2023-03-28 PROBLEM — Z87.59 HISTORY OF MISCARRIAGE: Status: ACTIVE | Noted: 2022-08-01

## 2023-03-28 PROBLEM — O09.93 PREGNANCY, SUPERVISION, HIGH-RISK, THIRD TRIMESTER: Status: ACTIVE | Noted: 2017-09-13

## 2023-03-28 PROCEDURE — 99212 OFFICE O/P EST SF 10 MIN: CPT | Performed by: NURSE PRACTITIONER

## 2023-03-28 RX ORDER — IBUPROFEN 800 MG/1
800 TABLET ORAL EVERY 8 HOURS
COMMUNITY
Start: 2023-03-03

## 2023-03-28 RX ORDER — HYDROXYCHLOROQUINE SULFATE 200 MG/1
2 TABLET, FILM COATED ORAL DAILY
COMMUNITY
Start: 2023-02-27

## 2023-03-28 NOTE — PROGRESS NOTES
Subjective   Aster Rosenberg is a 37 y.o. female.     History of Present Illness   Patient presents via video visit with c/o shingles. She reports that she has axillary pain on both sides of her body and that the pain goes all the way across her back.  She states that she does not have a rash. Patient states that she feels tired, she is three weeks post partum. She states that she feels similar to when she had shingles 10 years ago.  I discussed that shingles does not cross the midline and would not be on both sides of her body. I advised her to call back if she does develop a rash or any other symptoms.     The following portions of the patient's history were reviewed and updated as appropriate: allergies, current medications, past family history, past medical history, past social history, past surgical history and problem list.    Review of Systems   Constitutional: Negative for chills, fatigue and fever.   Respiratory: Negative for cough and shortness of breath.    Cardiovascular: Negative for chest pain, palpitations and leg swelling.   Musculoskeletal: Negative for arthralgias and joint swelling.   Skin: Negative for color change, dry skin, pallor, rash, skin lesions and wound.   Allergic/Immunologic: Negative.    Neurological: Negative for dizziness, weakness and headache.       Objective   Physical Exam  Constitutional:       Appearance: She is well-developed.   HENT:      Head: Normocephalic and atraumatic.   Eyes:      Pupils: Pupils are equal, round, and reactive to light.   Pulmonary:      Effort: Pulmonary effort is normal.   Skin:     Comments: Inspected patient's back and axillary areas by camera. No rash noted to either side.    Neurological:      Mental Status: She is alert and oriented to person, place, and time.   Psychiatric:         Behavior: Behavior normal.         Thought Content: Thought content normal.         Judgment: Judgment normal.         There were no vitals filed for this visit.  There  is no height or weight on file to calculate BMI.      Procedures    Assessment & Plan   Problems Addressed this Visit    None  Diagnoses    None.       You have chosen to receive care through a telehealth visit.  Do you consent to use a video/audio connection for your medical care today? Yes         Return if symptoms worsen or fail to improve.

## 2023-08-31 RX ORDER — PROPRANOLOL HYDROCHLORIDE 10 MG/1
TABLET ORAL
Qty: 180 TABLET | Refills: 3 | OUTPATIENT
Start: 2023-08-31